# Patient Record
Sex: FEMALE | Employment: STUDENT | URBAN - METROPOLITAN AREA
[De-identification: names, ages, dates, MRNs, and addresses within clinical notes are randomized per-mention and may not be internally consistent; named-entity substitution may affect disease eponyms.]

---
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
## 2023-05-08 ENCOUNTER — TELEPHONE (OUTPATIENT)
Dept: OTHER | Facility: OTHER | Age: 18
End: 2023-05-08

## 2023-05-08 NOTE — TELEPHONE ENCOUNTER
The patient schedule an appointment [facility] and she would like to change the appointment for Virtual

## 2023-05-25 ENCOUNTER — TELEMEDICINE (OUTPATIENT)
Dept: FAMILY MEDICINE CLINIC | Facility: HOSPITAL | Age: 18
End: 2023-05-25

## 2023-05-25 VITALS — BODY MASS INDEX: 40.97 KG/M2 | HEIGHT: 64 IN | WEIGHT: 240 LBS

## 2023-05-25 DIAGNOSIS — Z30.9 ENCOUNTER FOR CONTRACEPTIVE MANAGEMENT, UNSPECIFIED TYPE: Primary | ICD-10-CM

## 2023-05-25 DIAGNOSIS — F41.1 GAD (GENERALIZED ANXIETY DISORDER): ICD-10-CM

## 2023-05-25 DIAGNOSIS — F33.2 SEVERE EPISODE OF RECURRENT MAJOR DEPRESSIVE DISORDER, WITHOUT PSYCHOTIC FEATURES (HCC): ICD-10-CM

## 2023-05-25 RX ORDER — SERTRALINE HYDROCHLORIDE 100 MG/1
50 TABLET, FILM COATED ORAL DAILY
Qty: 90 TABLET | Refills: 3 | Status: SHIPPED | OUTPATIENT
Start: 2023-05-25

## 2023-05-25 RX ORDER — HYDROXYZINE HYDROCHLORIDE 10 MG/1
10 TABLET, FILM COATED ORAL 4 TIMES DAILY PRN
COMMUNITY
Start: 2023-05-23

## 2023-05-25 NOTE — PROGRESS NOTES
Virtual Regular Visit  Verification of patient location:  Patient is located in car, in the following state in which I hold an active license [place]    Assessment/Plan:   Diagnosis ICD-10-CM Associated Orders   1  Encounter for contraceptive management, unspecified type  Z30 9 Ambulatory Referral to Obstetrics / Gynecology      2  Severe episode of recurrent major depressive disorder, without psychotic features (HCC)  F33 2 sertraline (ZOLOFT) 100 mg tablet      3  KISHORE (generalized anxiety disorder)  F41 1 sertraline (ZOLOFT) 100 mg tablet      Patient's medications were reviewed and reconciled  Ordered/Re-ordered as above  Ref to GYN for planned LARC  C/W psychiatry & meds  F/U in 1-2 months or sooner for annual physical       Reason for visit is   Chief Complaint   Patient presents with   • Follow-up     [phone]   • Virtual Regular Visit        Encounter provider Rosalio Bain DO    Provider located at [address] [place] [id number]      Recent Visits  Date Type Provider Dept   05/25/23 Telemedicine Rosalio Bain DO [facility]   Showing recent visits within past 7 days and meeting all other requirements  Future Appointments  No visits were found meeting these conditions  Showing future appointments within next 150 days and meeting all other requirements     The patient was identified by name and date of birth  Alo Marcelo was informed that this is a telemedicine visit and that the visit is being conducted through the [employer]  She agrees to proceed  My office door was closed  No one else was in the room  She acknowledged consent and understanding of privacy and security of the video platform  The patient has agreed to participate and understands they can discontinue the visit at any time  Patient is aware this is a billable service       Venkatesh Melendez "Eliel Ferrell is a 21 y o  female   HPI   Wellbutrin has stayed the same, Was on lexapro, but not as helpful for the anxiety as the zoloft was  2d ago saw psych in [facility] - Dr Reymundo Chavez, Low dose lexapro stopped, and now resuming zoloft, 100 mg, starting at half  Considering going up on wellbutrin in the future if needed  Had several weeks of daily break downs without zoloft, now better  3 more weeks at school for research project, home then cruise, then home for summer until fall again  Past Medical History:   Diagnosis Date   • Anxiety    • Depression    • Headache        Past Surgical History:   Procedure Laterality Date   • NO PAST SURGERIES         Current Outpatient Medications   Medication Sig Dispense Refill   • Altavera 0 15-30 MG-MCG per tablet      • buPROPion (WELLBUTRIN XL) 300 mg 24 hr tablet Take 1 tablet (300 mg total) by mouth every morning 1 daily 90 tablet 3   • Cholecalciferol (Vitamin D3) 50 MCG (2000 UT) TABS Take 2,000 Units by mouth daily     • hydrOXYzine HCL (ATARAX) 10 mg tablet Take 10 mg by mouth 4 (four) times a day as needed     • hyoscyamine (LEVSIN/SL) 0 125 mg SL tablet Take 1 tablet (0 125 mg total) by mouth every 4 (four) hours as needed for cramping 60 tablet 2   • MELATONIN PO Take by mouth 1 at hs--unknown strength     • sertraline (ZOLOFT) 100 mg tablet Take 0 5 tablets (50 mg total) by mouth daily 90 tablet 3     No current facility-administered medications for this visit  No Known Allergies    Review of Systems   Constitutional: Negative for chills and fever  Neurological: Negative for light-headedness and numbness  Video Exam  Vitals:    05/25/23 1256   Weight: 109 kg (240 lb)   Height: 5' 4\" (1 626 m)     Physical Exam  Constitutional:       General: She is not in acute distress  Appearance: Normal appearance  She is obese  She is not ill-appearing  HENT:      Head: Normocephalic and atraumatic        Right Ear: External ear normal       Left Ear: " External ear normal    Eyes:      General: No scleral icterus  Right eye: No discharge  Left eye: No discharge  Pulmonary:      Effort: Pulmonary effort is normal  No respiratory distress  Musculoskeletal:      Cervical back: Normal range of motion  No rigidity  Neurological:      Mental Status: She is alert and oriented to person, place, and time  Coordination: Coordination normal    Psychiatric:         Mood and Affect: Mood normal          Behavior: Behavior normal          Thought Content:  Thought content normal          Judgment: Judgment normal           Visit Time  Total Visit Duration: 25 mins

## 2023-06-20 ENCOUNTER — OFFICE VISIT (OUTPATIENT)
Dept: OBGYN CLINIC | Facility: CLINIC | Age: 18
End: 2023-06-20
Payer: COMMERCIAL

## 2023-06-20 VITALS
BODY MASS INDEX: 41.25 KG/M2 | WEIGHT: 232.8 LBS | HEIGHT: 63 IN | DIASTOLIC BLOOD PRESSURE: 76 MMHG | SYSTOLIC BLOOD PRESSURE: 120 MMHG

## 2023-06-20 DIAGNOSIS — Z30.9 ENCOUNTER FOR CONTRACEPTIVE MANAGEMENT, UNSPECIFIED TYPE: ICD-10-CM

## 2023-06-20 DIAGNOSIS — Z01.419 ENCOUNTER FOR GYNECOLOGICAL EXAMINATION WITHOUT ABNORMAL FINDING: Primary | ICD-10-CM

## 2023-06-20 DIAGNOSIS — Z11.3 SCREEN FOR STD (SEXUALLY TRANSMITTED DISEASE): ICD-10-CM

## 2023-06-20 PROCEDURE — S0610 ANNUAL GYNECOLOGICAL EXAMINA: HCPCS | Performed by: NURSE PRACTITIONER

## 2023-06-20 NOTE — PROGRESS NOTES
Here for Nexplanon insertion  Universal Protocol:  Consent: Verbal consent obtained  Risks and benefits: risks, benefits and alternatives were discussed  Consent given by: patient  Patient understanding: patient states understanding of the procedure being performed  Patient identity confirmed: verbally with patient    Remove and insert drug implant    Date/Time: 6/22/2023 1:00 PM    Performed by: REMI Orourke  Authorized by: REMI Orourke    Indication:     Indication: Insertion of non-biodegradable drug delivery implant    Pre-procedure:     Prepped with: povidone-iodine      Local anesthetic:  Lidocaine with epinephrine  Procedure:     Procedure: Insertion    Small stab incision was made in arm: yes      Left/right:  Right    Preloaded contraceptive capsule trocar was placed subdermally: yes      Visualization of implant was obtained: yes      Contraceptive capsule was inserted and trocar removed: yes      Visualization of notch in stylet and palpation of device: yes      Palpation confirms placement by provider and patient: yes      Site was closed with steri-strips and pressure bandage applied: yes    Comments:      Irregular bleeding may persist for up to 3 months  Leave outside dressing on x 24 hours   Steri strips in place x 1 week  Monitor for signs of infection redness, warmth, increasing pain, pus, fever/chills  Return in 1 month for recheck

## 2023-06-20 NOTE — PATIENT INSTRUCTIONS
Pap smear to start at age 22 as per ASCCP guidelines  GC/CT cultures annually once sexually active, always condom use when sexually active,    Interested in C/ [place] 9 aware of irreg bleeding profile   Will check ins Return for insertion on Thursday

## 2023-06-20 NOTE — PROGRESS NOTES
Assessment/Plan:  Pap smear to start at age 22 as per ASCCP guidelines  GC/CT cultures annually once sexually active, always condom use when sexually active,    Interested in [address] aware of irreg bleeding profile  Will check ins Return for insertion on Thursday       Diagnoses and all orders for this visit:    Encounter for gynecological examination without abnormal finding  -     Chlamydia/N  gonorrhoeae RNA, TMA    Encounter for contraceptive management, unspecified type  Comments:  interested in Nexplanon R/B discussed will return Thursday for insertion  Orders:  -     Ambulatory Referral to Obstetrics / Gynecology    Screen for STD (sexually transmitted disease)  -     Chlamydia/N  gonorrhoeae RNA, TMA          Subjective:      Patient ID: Serjio Morales is a 19 y o  female  New pt here for annual gyn and bc Has 2 moms with sperm donor Pt in a relationship with transwoman Interested in [address] monthly Day 2-3 Heavy then tapers total of 4  Minimal cramps No other pain Bowel and bladder are normal Ano unusual discharge H/o depression Stable Continues to follow with therapist Augusto for Psych        The following portions of the patient's history were reviewed and updated as appropriate: allergies, current medications, past family history, past medical history, past social history, past surgical history and problem list     Review of Systems   Constitutional: Negative for fatigue and unexpected weight change  Gastrointestinal: Negative for abdominal distention, abdominal pain, constipation and diarrhea  Genitourinary: Negative for difficulty urinating, dyspareunia, dysuria, frequency, genital sores, menstrual problem, pelvic pain, urgency, vaginal bleeding, vaginal discharge and vaginal pain  Neurological: Negative for headaches  Psychiatric/Behavioral: Negative  Negative for dysphoric mood  The patient is not nervous/anxious            Objective:      /76 (BP "Location: Left arm, Patient Position: Sitting, Cuff Size: Large)   Ht 5' 3\" (1 6 m)   Wt 106 kg (232 lb 12 8 oz)   LMP 05/18/2023 (Exact Date)   BMI 41 24 kg/m²          Physical Exam  Vitals and nursing note reviewed  Constitutional:       General: She is not in acute distress  Appearance: Normal appearance  HENT:      Head: Normocephalic and atraumatic  Pulmonary:      Effort: Pulmonary effort is normal    Chest:   Breasts:     Breasts are symmetrical       Right: Normal  No mass, nipple discharge, skin change or tenderness  Left: Normal  No mass, nipple discharge, skin change or tenderness  Abdominal:      General: There is no distension  Palpations: Abdomen is soft  Tenderness: There is no abdominal tenderness  There is no guarding or rebound  Genitourinary:     General: Normal vulva  Exam position: Lithotomy position  Labia:         Right: No rash, tenderness, lesion or injury  Left: No rash, tenderness, lesion or injury  Urethra: No prolapse, urethral pain, urethral swelling or urethral lesion  Vagina: Normal  No erythema or lesions  Cervix: No cervical motion tenderness, discharge, lesion or cervical bleeding  Uterus: Normal        Adnexa: Right adnexa normal and left adnexa normal         Right: No mass or tenderness  Left: No mass or tenderness  Rectum: No mass or external hemorrhoid  Comments: G/C from cervix  Musculoskeletal:         General: Normal range of motion  Lymphadenopathy:      Upper Body:      Right upper body: No axillary adenopathy  Left upper body: No axillary adenopathy  Lower Body: No right inguinal adenopathy  No left inguinal adenopathy  Skin:     General: Skin is warm and dry  Neurological:      Mental Status: She is alert and oriented to person, place, and time     Psychiatric:         Mood and Affect: Mood normal          Behavior: Behavior normal          Thought Content: " Thought content normal          Judgment: Judgment normal

## 2023-06-21 LAB
C TRACH RRNA SPEC QL NAA+PROBE: NOT DETECTED
N GONORRHOEA RRNA SPEC QL NAA+PROBE: NOT DETECTED

## 2023-06-22 ENCOUNTER — PROCEDURE VISIT (OUTPATIENT)
Dept: OBGYN CLINIC | Facility: CLINIC | Age: 18
End: 2023-06-22
Payer: COMMERCIAL

## 2023-06-22 DIAGNOSIS — Z32.00 ENCOUNTER FOR PREGNANCY TEST, RESULT UNKNOWN: ICD-10-CM

## 2023-06-22 DIAGNOSIS — Z30.017 NEXPLANON INSERTION: Primary | ICD-10-CM

## 2023-06-22 LAB — SL AMB POCT URINE HCG: NEGATIVE

## 2023-06-22 PROCEDURE — 81025 URINE PREGNANCY TEST: CPT | Performed by: NURSE PRACTITIONER

## 2023-06-22 PROCEDURE — 11981 INSERTION DRUG DLVR IMPLANT: CPT | Performed by: NURSE PRACTITIONER

## 2023-07-12 ENCOUNTER — OFFICE VISIT (OUTPATIENT)
Dept: FAMILY MEDICINE CLINIC | Facility: HOSPITAL | Age: 18
End: 2023-07-12
Payer: COMMERCIAL

## 2023-07-12 VITALS
SYSTOLIC BLOOD PRESSURE: 110 MMHG | OXYGEN SATURATION: 98 % | HEART RATE: 102 BPM | WEIGHT: 229 LBS | HEIGHT: 63 IN | BODY MASS INDEX: 40.57 KG/M2 | DIASTOLIC BLOOD PRESSURE: 80 MMHG

## 2023-07-12 DIAGNOSIS — Z00.00 ROUTINE ADULT HEALTH MAINTENANCE: Primary | ICD-10-CM

## 2023-07-12 DIAGNOSIS — E55.9 VITAMIN D DEFICIENCY: ICD-10-CM

## 2023-07-12 DIAGNOSIS — F41.1 GAD (GENERALIZED ANXIETY DISORDER): ICD-10-CM

## 2023-07-12 DIAGNOSIS — R45.89 DEPRESSED MOOD: ICD-10-CM

## 2023-07-12 DIAGNOSIS — F33.2 SEVERE EPISODE OF RECURRENT MAJOR DEPRESSIVE DISORDER, WITHOUT PSYCHOTIC FEATURES (HCC): ICD-10-CM

## 2023-07-12 PROCEDURE — 99395 PREV VISIT EST AGE 18-39: CPT | Performed by: STUDENT IN AN ORGANIZED HEALTH CARE EDUCATION/TRAINING PROGRAM

## 2023-07-12 RX ORDER — SERTRALINE HYDROCHLORIDE 100 MG/1
150 TABLET, FILM COATED ORAL DAILY
Qty: 90 TABLET | Refills: 3 | Status: SHIPPED | OUTPATIENT
Start: 2023-07-12

## 2023-07-12 NOTE — PROGRESS NOTES
[address] [facility] PRIMARY CARE [address]    NAME: Alma Fernandez  AGE: 19 y.o. SEX: female  : 2005  DATE: 2023     Assessment and Plan:      Diagnosis ICD-10-CM Associated Orders   1. Routine adult health maintenance  Z00.00       2. Severe episode of recurrent major depressive disorder, without psychotic features (HCC)  F33.2 sertraline (ZOLOFT) 100 mg tablet      3. KISHORE (generalized anxiety disorder)  F41.1 sertraline (ZOLOFT) 100 mg tablet      4. Depressed mood  R45.89       5. Vitamin D deficiency  E55.9       6. BMI 40.0-44.9, adult [facility])  Z68.41         Patient's medications were reviewed and reconciled. Ordered/Re-ordered as above. Continue with GYN and Psychiatry. Immunizations and preventive care screenings were discussed with patient today. Appropriate education was printed on patient's after visit summary. Counseling:  Alcohol/drug use: discussed moderation in alcohol intake, the recommendations for healthy alcohol use, and avoidance of illicit drug use. Dental Health: discussed importance of regular tooth brushing, flossing, and dental visits. Injury prevention: discussed safety/seat belts, safety helmets, smoke detectors, carbon dioxide detectors, and smoking near bedding or upholstery. Sexual health: discussed sexually transmitted diseases, partner selection, use of condoms, avoidance of unintended pregnancy, and contraceptive alternatives. · Exercise: the importance of regular exercise/physical activity was discussed. Recommend exercise 3-5 times per week for at least 30 minutes. BMI Counseling: Body mass index is 40.57 kg/m². The BMI is above normal. Nutrition recommendations include decreasing portion sizes and encouraging healthy choices of fruits and vegetables. Exercise recommendations include moderate physical activity 150 minutes/week, exercising 3-5 times per week and strength training exercises. Rationale for BMI follow-up plan is due to patient being overweight or obese. Depression Screening and Follow-up Plan: Patient's depression screening was positive with a PHQ-2 score of 3. Their PHQ-9 score was 9. Continue regular follow-up with their mental health provider who is managing their mental health condition(s). No follow-ups on file. Chief Complaint:     Chief Complaint   Patient presents with   • Physical Exam      History of Present Illness:     Adult Annual Physical   Patient here for a comprehensive physical exam.     Diet and Physical Activity  · Diet/Nutrition: plant based when home & gets mom cooked meals, school cafe  · Exercise: moderate cardiovascular exercise, strength training exercises, 1-2 times a week on average and core workout. No Drug use. Tobacco use:  reports that she has never smoked. She has never used smokeless tobacco.  · Alcohol use - no    Highest 240 - lost 8 lbs. Was doing more dorm, more walking - hills at school. Wt Readings from Last 3 Encounters:   23 104 kg (229 lb)   23 106 kg (232 lb 12.8 oz)   23 109 kg (240 lb) (>99 %, Z= 2.43)*     * Growth percentiles are based on CDC (Girls, 2-20 Years) data. Depression Screening  PHQ-2/9 Depression Screening    Little interest or pleasure in doing things: 1 - several days  Feeling down, depressed, or hopeless: 2 - more than half the days  Trouble falling or staying asleep, or sleeping too much: 0 - not at all  Feeling tired or having little energy: 1 - several days  Poor appetite or overeatin - not at all  Feeling bad about yourself - or that you are a failure or have let yourself or your family down: 2 - more than half the days  Trouble concentrating on things, such as reading the newspaper or watching television: 2 - more than half the days  Moving or speaking so slowly that other people could have noticed.  Or the opposite - being so fidgety or restless that you have been moving around a lot more than usual: 1 - several days  Thoughts that you would be better off dead, or of hurting yourself in some way: 0 - not at all  PHQ-2 Score: 3  PHQ-2 Interpretation: POSITIVE depression screen  PHQ-9 Score: 9   PHQ-9 Interpretation: Mild depression      Can have very bad mood swings & trying to stay on her meds. Does forget often. Anxious & worsened paranoia without meds. No SI or HI. Stress with mom at times. Last semester was awful. Will be a clair. General Health  · Sleep: sleeps well. · Hearing: normal - bilateral.  · Vision: goes for regular eye exams and wears glasses. · Dental: regular dental visits and brushes teeth twice daily. · Just had wisdom teeth out - all 4    /GYN Health  · Last menstrual period: 6/19/23  · Contraceptive method: nexplanon. · History of STDs?: no  · Has GF - she is trans - M to F     Review of Systems:     Review of Systems   Constitutional: Negative for chills and fever. Respiratory: Negative for cough and shortness of breath. Cardiovascular: Negative for chest pain and palpitations. Genitourinary: Negative for pelvic pain and vaginal bleeding. Neurological: Positive for headaches (minor - tried to drink more water). Negative for light-headedness. Psychiatric/Behavioral: Positive for dysphoric mood. The patient is nervous/anxious.         Past Medical History:     Past Medical History:   Diagnosis Date   • Anxiety    • Depression    • Headache       Past Surgical History:     Past Surgical History:   Procedure Laterality Date   • NO PAST SURGERIES     • WISDOM TOOTH EXTRACTION        Social History:     Social History     Socioeconomic History   • Marital status: Single     Spouse name: None   • Number of children: None   • Years of education: None   • Highest education level: None   Occupational History   • None   Tobacco Use   • Smoking status: Never   • Smokeless tobacco: Never   Vaping Use   • Vaping Use: Never used   Substance and Sexual Activity   • Alcohol use: Never   • Drug use: Never   • Sexual activity: Yes     Birth control/protection: Condom Male, OCP     Comment: transwomen - 1 new partner in past year   Other Topics Concern   • None   Social History Narrative    Lives with mom, step-mom and brother.     Feels safe at home    Sees dentist reg     Social Determinants of Health     Financial Resource Strain: Not on file   Food Insecurity: Not on file   Transportation Needs: Not on file   Physical Activity: Not on file   Stress: Not on file   Social Connections: Not on file   Intimate Partner Violence: Not on file   Housing Stability: Not on file      Family History:     Family History   Problem Relation Age of Onset   • Mental illness Mother    • Depression Mother    • Anxiety disorder Mother    • Hypertension Mother    • No Known Problems Father    • Hypertension Brother    • Heart disease Maternal Grandmother    • Thyroid disease Maternal Grandmother    • Hypertension Maternal Grandmother    • Hyperlipidemia Maternal Grandmother    • Prostate cancer Maternal Grandfather    • Cancer Maternal Grandfather         AITL   • Heart disease Maternal Grandfather    • Hypertension Maternal Grandfather    • Hyperlipidemia Maternal Grandfather    • Asthma Maternal Aunt    • Heart disease Maternal Uncle    • Hypertension Maternal Uncle    • Hyperlipidemia Maternal Uncle    • Colon polyps Neg Hx    • Colon cancer Neg Hx       Current Medications:     Current Outpatient Medications   Medication Sig Dispense Refill   • buPROPion (WELLBUTRIN XL) 300 mg 24 hr tablet Take 1 tablet (300 mg total) by mouth every morning 1 daily 90 tablet 3   • Cholecalciferol (Vitamin D3) 50 MCG (2000 UT) TABS Take 2,000 Units by mouth daily     • etonogestrel (NEXPLANON) subdermal implant 68 mg by Subdermal route once 3 yrs     • hydrOXYzine HCL (ATARAX) 10 mg tablet Take 10 mg by mouth 4 (four) times a day as needed     • sertraline (ZOLOFT) 100 mg tablet Take 1.5 tablets (150 mg total) by mouth daily 90 tablet 3     No current facility-administered medications for this visit. Allergies:     No Known Allergies   Physical Exam:     /80   Pulse 102   Ht 5' 3" (1.6 m)   Wt 104 kg (229 lb)   LMP 05/18/2023 (Exact Date)   SpO2 98%   BMI 40.57 kg/m²     Physical Exam  Vitals reviewed. Constitutional:       General: She is not in acute distress. Appearance: Normal appearance. She is obese. She is not ill-appearing. HENT:      Head: Normocephalic and atraumatic. Right Ear: Tympanic membrane, ear canal and external ear normal. There is no impacted cerumen. Left Ear: Tympanic membrane, ear canal and external ear normal. There is no impacted cerumen. Nose: Nose normal. No congestion or rhinorrhea. Mouth/Throat:      Mouth: Mucous membranes are moist.      Pharynx: Oropharynx is clear. No oropharyngeal exudate or posterior oropharyngeal erythema. Eyes:      General: No scleral icterus. Right eye: No discharge. Left eye: No discharge. Conjunctiva/sclera: Conjunctivae normal.   Cardiovascular:      Rate and Rhythm: Normal rate and regular rhythm. Pulses: Normal pulses. Heart sounds: Normal heart sounds. No murmur heard. No friction rub. No gallop. Pulmonary:      Effort: Pulmonary effort is normal. No respiratory distress. Breath sounds: Normal breath sounds. No stridor. No wheezing. Abdominal:      General: Abdomen is flat. Bowel sounds are normal. There is no distension. Palpations: Abdomen is soft. There is no mass. Tenderness: There is no abdominal tenderness. Musculoskeletal:         General: No swelling or tenderness. Normal range of motion. Cervical back: Normal range of motion and neck supple. No rigidity. Right lower leg: No edema. Left lower leg: No edema. Lymphadenopathy:      Cervical: No cervical adenopathy. Skin:     General: Skin is warm and dry. Capillary Refill: Capillary refill takes less than 2 seconds. Coloration: Skin is not jaundiced or pale. Neurological:      Mental Status: She is alert and oriented to person, place, and time. Psychiatric:         Mood and Affect: Mood normal.         Behavior: Behavior normal.         Thought Content:  Thought content normal.         Judgment: Judgment normal.        Josephine Addison,    [address]

## 2023-07-17 NOTE — PROGRESS NOTES
Assessment/Plan:  Irregular bleeding may persist for up to 3 months   Call with concerns   F/u annual gyn        Diagnoses and all orders for this visit:    Korin Jaquez in place          Subjective:      Patient ID: Gus Mcintosh is a 19 y.o. female. Here for nexplanon check inserted 6/2023 doing well No periods or bleeding Mood is stable h/o depression No headaches   Insertion site well healed       The following portions of the patient's history were reviewed and updated as appropriate: allergies, current medications, past family history, past medical history, past social history, past surgical history and problem list.    Review of Systems   Gastrointestinal: Negative for nausea. Genitourinary: Negative for menstrual problem, pelvic pain and vaginal bleeding. Neurological: Negative for headaches. Psychiatric/Behavioral: Negative for behavioral problems and dysphoric mood. Objective:      /82 (BP Location: Right arm, Patient Position: Sitting, Cuff Size: Large)   Ht 5' 3" (1.6 m)   Wt 106 kg (233 lb 12.8 oz)   LMP 05/18/2023 (Exact Date)   BMI 41.42 kg/m²          Physical Exam  Constitutional:       General: She is not in acute distress. Appearance: Normal appearance. HENT:      Head: Normocephalic and atraumatic. Pulmonary:      Effort: Pulmonary effort is normal.   Skin:     General: Skin is warm and dry. Comments: Nexplanon insertion site well healed No erythema, swelling, tenderness or bruising.  Implant palpated in arm   Neurological:      Comments: No neuropathy noted   Psychiatric:         Mood and Affect: Mood normal.

## 2023-07-18 ENCOUNTER — OFFICE VISIT (OUTPATIENT)
Dept: OBGYN CLINIC | Facility: CLINIC | Age: 18
End: 2023-07-18
Payer: COMMERCIAL

## 2023-07-18 VITALS
SYSTOLIC BLOOD PRESSURE: 118 MMHG | BODY MASS INDEX: 41.43 KG/M2 | WEIGHT: 233.8 LBS | DIASTOLIC BLOOD PRESSURE: 82 MMHG | HEIGHT: 63 IN

## 2023-07-18 DIAGNOSIS — Z97.5 NEXPLANON IN PLACE: Primary | ICD-10-CM

## 2023-07-18 PROCEDURE — 99212 OFFICE O/P EST SF 10 MIN: CPT | Performed by: NURSE PRACTITIONER

## 2023-10-27 DIAGNOSIS — F33.2 SEVERE EPISODE OF RECURRENT MAJOR DEPRESSIVE DISORDER, WITHOUT PSYCHOTIC FEATURES (HCC): ICD-10-CM

## 2023-10-27 DIAGNOSIS — F41.1 GAD (GENERALIZED ANXIETY DISORDER): ICD-10-CM

## 2023-10-27 RX ORDER — BUPROPION HYDROCHLORIDE 300 MG/1
TABLET ORAL
Qty: 90 TABLET | Refills: 2 | Status: SHIPPED | OUTPATIENT
Start: 2023-10-27

## 2024-10-07 ENCOUNTER — TELEMEDICINE (OUTPATIENT)
Dept: FAMILY MEDICINE CLINIC | Facility: HOSPITAL | Age: 19
End: 2024-10-07
Payer: COMMERCIAL

## 2024-10-07 VITALS — WEIGHT: 263 LBS | BODY MASS INDEX: 44.9 KG/M2 | HEIGHT: 64 IN

## 2024-10-07 DIAGNOSIS — Z11.59 NEED FOR HEPATITIS C SCREENING TEST: ICD-10-CM

## 2024-10-07 DIAGNOSIS — E55.9 VITAMIN D DEFICIENCY: ICD-10-CM

## 2024-10-07 DIAGNOSIS — R45.89 DEPRESSED MOOD: ICD-10-CM

## 2024-10-07 DIAGNOSIS — E66.813 CLASS 3 DRUG-INDUCED OBESITY WITHOUT SERIOUS COMORBIDITY WITH BODY MASS INDEX (BMI) OF 45.0 TO 49.9 IN ADULT (HCC): ICD-10-CM

## 2024-10-07 DIAGNOSIS — F33.0 MILD EPISODE OF RECURRENT MAJOR DEPRESSIVE DISORDER (HCC): Primary | ICD-10-CM

## 2024-10-07 DIAGNOSIS — Z13.220 SCREENING FOR HYPERLIPIDEMIA: ICD-10-CM

## 2024-10-07 DIAGNOSIS — Z13.29 SCREENING FOR THYROID DISORDER: ICD-10-CM

## 2024-10-07 DIAGNOSIS — Z11.4 SCREENING FOR HIV (HUMAN IMMUNODEFICIENCY VIRUS): ICD-10-CM

## 2024-10-07 DIAGNOSIS — R63.5 ABNORMAL WEIGHT GAIN: ICD-10-CM

## 2024-10-07 DIAGNOSIS — E66.1 CLASS 3 DRUG-INDUCED OBESITY WITHOUT SERIOUS COMORBIDITY WITH BODY MASS INDEX (BMI) OF 45.0 TO 49.9 IN ADULT (HCC): ICD-10-CM

## 2024-10-07 DIAGNOSIS — Z11.3 SCREENING EXAMINATION FOR STI: ICD-10-CM

## 2024-10-07 DIAGNOSIS — F41.1 GAD (GENERALIZED ANXIETY DISORDER): ICD-10-CM

## 2024-10-07 DIAGNOSIS — Z13.1 SCREENING FOR DIABETES MELLITUS: ICD-10-CM

## 2024-10-07 DIAGNOSIS — Z13.0 SCREENING FOR IRON DEFICIENCY ANEMIA: ICD-10-CM

## 2024-10-07 PROCEDURE — 99214 OFFICE O/P EST MOD 30 MIN: CPT | Performed by: STUDENT IN AN ORGANIZED HEALTH CARE EDUCATION/TRAINING PROGRAM

## 2024-10-07 RX ORDER — BUPROPION HYDROCHLORIDE 450 MG/1
450 TABLET, FILM COATED, EXTENDED RELEASE ORAL DAILY
COMMUNITY
Start: 2024-09-27

## 2024-10-07 NOTE — PROGRESS NOTES
Virtual Regular Visit  Name: Nora Gregory      : 2005      MRN: [id number]  Encounter Provider: Chetna Barraza DO  Encounter Date: 10/7/2024   Encounter department: [facility] PRIMARY CARE [address]    Verification of patient location:  Patient is located at Home in the following state in which I hold an active license [place]    Assessment & Plan  Mild episode of recurrent major depressive disorder (HCC)  Pt following closely with therapist & psychiatrist.   Changed her meds from wellbutrin 300 mg qd to 450 mg qd.   She is gaining weight though which is making her more depressed.      KISHORE (generalized anxiety disorder)  On zoloft with the wellbutrin daily for her KISHORE & MDD.      Depressed mood  Re-check lab values & then look into weight med options.  Orders:    Comprehensive metabolic panel; Future    Vitamin B12; Future    Iron, TIBC and Ferritin Panel; Future    CBC and differential; Future    Thyroid Peroxidase and Thyroglobulin Antibodies; Future    Comprehensive metabolic panel    Vitamin B12    Iron, TIBC and Ferritin Panel    CBC and differential    Thyroid Peroxidase and Thyroglobulin Antibodies    Vitamin D deficiency  Recheck levels, low before. [place]bid[place]   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 We have to stop it before she gets closer to 300 lbs. We discussed increasing her exercise & her water intake. She needs to shoot for more veggies & lean proteins, less carbs overall.   R/O thyroid etiology & then look into weight meds.   Orders:    TSH, 3rd generation; Future    T4, free; Future    Thyroid Peroxidase and Thyroglobulin Antibodies; Future    TSH, 3rd generation    T4, free    Thyroid Peroxidase and Thyroglobulin Antibodies    Need for  hepatitis C screening test  Screening & diagnostic bloodwork ordered, our office will reach out with results once obtained.   Orders:    Hepatitis C Qualitative by PCR ([facility] ONLY); Future    Hepatitis C Qualitative by PCR ([facility] ONLY)    Screening for HIV (human immunodeficiency virus)  Orders:    HIV 1/2 AG/AB W REFLEX [facility] only; Future    HIV 1/2 AG/AB W REFLEX [facility] and [facility] only    Screening for iron deficiency anemia  Orders:    CBC and differential; Future    CBC and differential    Screening for thyroid disorder  Orders:    TSH, 3rd generation; Future    T4, free; Future    Thyroid Peroxidase and Thyroglobulin Antibodies; Future    TSH, 3rd generation    T4, free    Thyroid Peroxidase and Thyroglobulin Antibodies    Screening for hyperlipidemia  Orders:    Lipid Panel with Direct LDL reflex; Future    Lipid Panel with Direct LDL reflex    Screening for diabetes mellitus  Orders:    Comprehensive metabolic panel; Future    Comprehensive metabolic panel    Abnormal weight gain  Orders:    Thyroid Peroxidase and Thyroglobulin Antibodies; Future    Thyroid Peroxidase and Thyroglobulin Antibodies    Screening examination for STI  Orders:    Hepatitis C Qualitative by PCR ([facility] ONLY); Future    HIV 1/2 AG/AB W REFLEX [facility] only; Future    Chlamydia/GC amplified DNA by PCR; Future    Hepatitis C Qualitative by PCR ([facility] ONLY)    HIV 1/2 AG/AB W REFLEX [facility] only    Chlamydia/GC amplified DNA by PCR      Depression Screening and Follow-up Plan: Patient was screened for depression during today's encounter. They screened negative with a PHQ-2 score of 2.    Return in about 4 weeks (around 11/4/2024) for [facility] - VIRTUAL - PT AT [facility].    Encounter provider Chetna Barraza DO    The patient was identified by name and date of birth. Nora Gregory was informed that this is a telemedicine visit and that the visit is being conducted through the Epic Embedded platform. She  "agrees to proceed..  My office door was closed. No one else was in the room.  She acknowledged consent and understanding of privacy and security of the video platform. The patient has agreed to participate and understands they can discontinue the visit at any time. Patient is aware this is a billable service.     History of Present Illness     HPI  Chief Complaint   Patient presents with    Follow-up     Weight check     Medication Management     Back at school for senior yr in psychology in Sept.     Was seeing psychiatrist - new doctor q4-6 week appts.   Was on wellbutrin 300 mg XL, now on 450 mg XL.   On zoloft 150 mg qd - no recent changes.   Rare use of atarax for anxiety flare.   Feels okay right now. No sleep or eating issues.   Keeping up with school work well.     PHQ-2/9 Depression Screening    Little interest or pleasure in doing things: 1 - several days  Feeling down, depressed, or hopeless: 1 - several days  PHQ-2 Score: 2  PHQ-2 Interpretation: Negative depression screen       Trying to eat healthy, going to gym on her campus.     Depression is worse bc her weight is worsening. Feels \"hopeless\" nothing is working.   20 -30 lb weight gain nearly every yr.   No fam hx of thyroid issues.   Diabetes in the family.     Does have nexplanon - put in last summer.   Was gaining weight prior to that, even since middle school.     Wt Readings from Last 3 Encounters:   10/07/24 119 kg (263 lb)   07/18/23 106 kg (233 lb 12.8 oz)   07/12/23 104 kg (229 lb)     Temp Readings from Last 3 Encounters:   No data found for Temp     BP Readings from Last 3 Encounters:   07/18/23 118/82   07/12/23 110/80   06/20/23 120/76     Pulse Readings from Last 3 Encounters:   07/12/23 102   07/20/22 68   06/21/22 78     Review of Systems  Current Outpatient Medications on File Prior to Visit   Medication Sig Dispense Refill    buPROPion (FORFIVO XL) 450 MG 24 hr tablet Take 450 mg by mouth daily      etonogestrel (NEXPLANON) " "subdermal implant 68 mg by Subdermal route once 3 yrs      hydrOXYzine HCL (ATARAX) 10 mg tablet Take 10 mg by mouth 4 (four) times a day as needed      sertraline (ZOLOFT) 100 mg tablet Take 1.5 tablets (150 mg total) by mouth daily 90 tablet 3    [DISCONTINUED] buPROPion (WELLBUTRIN XL) 300 mg 24 hr tablet TAKE 1 TABLET (300 MG TOTAL) BY MOUTH EVERY MORNING (1 DAILY) (Patient taking differently: Take 450 mg by mouth every morning) 90 tablet 2    Cholecalciferol (Vitamin D3) 50 MCG (2000 UT) TABS Take 2,000 Units by mouth daily (Patient not taking: Reported on 7/18/2023)       No current facility-administered medications on file prior to visit.      Social History     Tobacco Use    Smoking status: Never    Smokeless tobacco: Never   Vaping Use    Vaping status: Never Used   Substance and Sexual Activity    Alcohol use: Never    Drug use: Never    Sexual activity: Yes     Birth control/protection: Condom Male, OCP     Comment: transwomen - 1 new partner in past year     Objective     Ht 5' 4\" (1.626 m)   Wt 119 kg (263 lb)   BMI 45.14 kg/m²   Physical Exam  Vitals and nursing note reviewed.   Constitutional:       General: She is not in acute distress.     Appearance: Normal appearance. She is well-developed and well-groomed. She is morbidly obese. She is not ill-appearing.   HENT:      Head: Normocephalic and atraumatic.   Eyes:      General: No scleral icterus.        Right eye: No discharge.         Left eye: No discharge.      Conjunctiva/sclera: Conjunctivae normal.   Pulmonary:      Effort: Pulmonary effort is normal. No respiratory distress.   Musculoskeletal:      Cervical back: Normal range of motion and neck supple. No rigidity.   Skin:     General: Skin is warm and dry.      Capillary Refill: Capillary refill takes less than 2 seconds.   Neurological:      Mental Status: She is alert and oriented to person, place, and time.   Psychiatric:         Mood and Affect: Mood normal.         Behavior: Behavior " normal. Behavior is cooperative.         Thought Content: Thought content normal.         Judgment: Judgment normal.     Visit Time  Total Visit Duration: 35

## 2024-10-07 NOTE — ASSESSMENT & PLAN NOTE
Recheck levels, low before. [place]   Orders:    Vitamin D 25 hydroxy; Future    Vitamin D 25 hydroxy

## 2024-10-07 NOTE — ASSESSMENT & PLAN NOTE
Re-check lab values & then look into weight med options.  Orders:    Comprehensive metabolic panel; Future    Vitamin B12; Future    Iron, TIBC and Ferritin Panel; Future    CBC and differential; Future    Thyroid Peroxidase and Thyroglobulin Antibodies; Future    Comprehensive metabolic panel    Vitamin B12    Iron, TIBC and Ferritin Panel    CBC and differential    Thyroid Peroxidase and Thyroglobulin Antibodies

## 2024-10-11 LAB
25(OH)D3 SERPL-MCNC: 20 NG/ML (ref 30–100)
ALBUMIN SERPL-MCNC: 4 G/DL (ref 3.6–5.1)
ALBUMIN/GLOB SERPL: 1.5 (CALC) (ref 1–2.5)
ALP SERPL-CCNC: 62 U/L (ref 31–125)
ALT SERPL-CCNC: 12 U/L (ref 6–29)
AST SERPL-CCNC: 16 U/L (ref 10–30)
BASOPHILS # BLD AUTO: 20 CELLS/UL (ref 0–200)
BASOPHILS NFR BLD AUTO: 0.4 %
BILIRUB SERPL-MCNC: 0.5 MG/DL (ref 0.2–1.2)
BUN SERPL-MCNC: 11 MG/DL (ref 7–25)
BUN/CREAT SERPL: NORMAL (CALC) (ref 6–22)
C TRACH RRNA SPEC QL NAA+PROBE: NOT DETECTED
CALCIUM SERPL-MCNC: 9.4 MG/DL (ref 8.6–10.2)
CHLORIDE SERPL-SCNC: 105 MMOL/L (ref 98–110)
CHOLEST SERPL-MCNC: 151 MG/DL
CHOLEST/HDLC SERPL: 3.1 (CALC)
CO2 SERPL-SCNC: 27 MMOL/L (ref 20–32)
CREAT SERPL-MCNC: 0.74 MG/DL (ref 0.5–0.96)
EOSINOPHIL # BLD AUTO: 30 CELLS/UL (ref 15–500)
EOSINOPHIL NFR BLD AUTO: 0.6 %
ERYTHROCYTE [DISTWIDTH] IN BLOOD BY AUTOMATED COUNT: 12.2 % (ref 11–15)
FERRITIN SERPL-MCNC: 12 NG/ML (ref 16–154)
GFR/BSA.PRED SERPLBLD CYS-BASED-ARV: 118 ML/MIN/1.73M2
GLOBULIN SER CALC-MCNC: 2.6 G/DL (CALC) (ref 1.9–3.7)
GLUCOSE SERPL-MCNC: 98 MG/DL (ref 65–139)
HCT VFR BLD AUTO: 39.8 % (ref 35–45)
HCV RNA SERPL NAA+PROBE-ACNC: NORMAL IU/ML
HCV RNA SERPL NAA+PROBE-LOG IU: NORMAL LOG IU/ML
HDLC SERPL-MCNC: 49 MG/DL
HGB BLD-MCNC: 13 G/DL (ref 11.7–15.5)
HIV 1+2 AB+HIV1 P24 AG SERPL QL IA: NORMAL
IRON SATN MFR SERPL: 20 % (CALC) (ref 16–45)
IRON SERPL-MCNC: 73 MCG/DL (ref 40–190)
LDLC SERPL CALC-MCNC: 88 MG/DL (CALC)
LYMPHOCYTES # BLD AUTO: 1645 CELLS/UL (ref 850–3900)
LYMPHOCYTES NFR BLD AUTO: 32.9 %
MCH RBC QN AUTO: 28.3 PG (ref 27–33)
MCHC RBC AUTO-ENTMCNC: 32.7 G/DL (ref 32–36)
MCV RBC AUTO: 86.7 FL (ref 80–100)
MONOCYTES # BLD AUTO: 335 CELLS/UL (ref 200–950)
MONOCYTES NFR BLD AUTO: 6.7 %
N GONORRHOEA RRNA SPEC QL NAA+PROBE: NOT DETECTED
NEUTROPHILS # BLD AUTO: 2970 CELLS/UL (ref 1500–7800)
NEUTROPHILS NFR BLD AUTO: 59.4 %
NONHDLC SERPL-MCNC: 102 MG/DL (CALC)
PLATELET # BLD AUTO: 282 THOUSAND/UL (ref 140–400)
PMV BLD REES-ECKER: 9.6 FL (ref 7.5–12.5)
POTASSIUM SERPL-SCNC: 4 MMOL/L (ref 3.5–5.3)
PROT SERPL-MCNC: 6.6 G/DL (ref 6.1–8.1)
RBC # BLD AUTO: 4.59 MILLION/UL (ref 3.8–5.1)
SODIUM SERPL-SCNC: 139 MMOL/L (ref 135–146)
T4 FREE SERPL-MCNC: 1.2 NG/DL (ref 0.8–1.8)
THYROGLOB AB SERPL-ACNC: <1 IU/ML
THYROPEROXIDASE AB SERPL-ACNC: 1 IU/ML
TIBC SERPL-MCNC: 357 MCG/DL (CALC) (ref 250–450)
TRIGL SERPL-MCNC: 59 MG/DL
TSH SERPL-ACNC: 1.75 MIU/L
VIT B12 SERPL-MCNC: 293 PG/ML (ref 200–1100)
WBC # BLD AUTO: 5 THOUSAND/UL (ref 3.8–10.8)

## 2024-10-14 LAB
25(OH)D3 SERPL-MCNC: 20 NG/ML (ref 30–100)
ALBUMIN SERPL-MCNC: 4 G/DL (ref 3.6–5.1)
ALBUMIN/GLOB SERPL: 1.5 (CALC) (ref 1–2.5)
ALP SERPL-CCNC: 62 U/L (ref 31–125)
ALT SERPL-CCNC: 12 U/L (ref 6–29)
AST SERPL-CCNC: 16 U/L (ref 10–30)
BASOPHILS # BLD AUTO: 20 CELLS/UL (ref 0–200)
BASOPHILS NFR BLD AUTO: 0.4 %
BILIRUB SERPL-MCNC: 0.5 MG/DL (ref 0.2–1.2)
BUN SERPL-MCNC: 11 MG/DL (ref 7–25)
BUN/CREAT SERPL: NORMAL (CALC) (ref 6–22)
C TRACH RRNA SPEC QL NAA+PROBE: NOT DETECTED
CALCIUM SERPL-MCNC: 9.4 MG/DL (ref 8.6–10.2)
CHLORIDE SERPL-SCNC: 105 MMOL/L (ref 98–110)
CHOLEST SERPL-MCNC: 151 MG/DL
CHOLEST/HDLC SERPL: 3.1 (CALC)
CO2 SERPL-SCNC: 27 MMOL/L (ref 20–32)
CREAT SERPL-MCNC: 0.74 MG/DL (ref 0.5–0.96)
EOSINOPHIL # BLD AUTO: 30 CELLS/UL (ref 15–500)
EOSINOPHIL NFR BLD AUTO: 0.6 %
ERYTHROCYTE [DISTWIDTH] IN BLOOD BY AUTOMATED COUNT: 12.2 % (ref 11–15)
FERRITIN SERPL-MCNC: 12 NG/ML (ref 16–154)
GFR/BSA.PRED SERPLBLD CYS-BASED-ARV: 118 ML/MIN/1.73M2
GLOBULIN SER CALC-MCNC: 2.6 G/DL (CALC) (ref 1.9–3.7)
GLUCOSE SERPL-MCNC: 98 MG/DL (ref 65–139)
HBA1C MFR BLD: 5.2 % OF TOTAL HGB
HCT VFR BLD AUTO: 39.8 % (ref 35–45)
HCV RNA SERPL NAA+PROBE-ACNC: NORMAL IU/ML
HCV RNA SERPL NAA+PROBE-LOG IU: NORMAL LOG IU/ML
HDLC SERPL-MCNC: 49 MG/DL
HGB BLD-MCNC: 13 G/DL (ref 11.7–15.5)
HIV 1+2 AB+HIV1 P24 AG SERPL QL IA: NORMAL
IRON SATN MFR SERPL: 20 % (CALC) (ref 16–45)
IRON SERPL-MCNC: 73 MCG/DL (ref 40–190)
LDLC SERPL CALC-MCNC: 88 MG/DL (CALC)
LYMPHOCYTES # BLD AUTO: 1645 CELLS/UL (ref 850–3900)
LYMPHOCYTES NFR BLD AUTO: 32.9 %
MCH RBC QN AUTO: 28.3 PG (ref 27–33)
MCHC RBC AUTO-ENTMCNC: 32.7 G/DL (ref 32–36)
MCV RBC AUTO: 86.7 FL (ref 80–100)
MONOCYTES # BLD AUTO: 335 CELLS/UL (ref 200–950)
MONOCYTES NFR BLD AUTO: 6.7 %
N GONORRHOEA RRNA SPEC QL NAA+PROBE: NOT DETECTED
NEUTROPHILS # BLD AUTO: 2970 CELLS/UL (ref 1500–7800)
NEUTROPHILS NFR BLD AUTO: 59.4 %
NONHDLC SERPL-MCNC: 102 MG/DL (CALC)
PLATELET # BLD AUTO: 282 THOUSAND/UL (ref 140–400)
PMV BLD REES-ECKER: 9.6 FL (ref 7.5–12.5)
POTASSIUM SERPL-SCNC: 4 MMOL/L (ref 3.5–5.3)
PROT SERPL-MCNC: 6.6 G/DL (ref 6.1–8.1)
RBC # BLD AUTO: 4.59 MILLION/UL (ref 3.8–5.1)
REF LAB TEST NAME: NORMAL
REF LAB TEST: NORMAL
SL AMB CLIENT CONTACT: NORMAL
SODIUM SERPL-SCNC: 139 MMOL/L (ref 135–146)
T4 FREE SERPL-MCNC: 1.2 NG/DL (ref 0.8–1.8)
THYROGLOB AB SERPL-ACNC: <1 IU/ML
THYROPEROXIDASE AB SERPL-ACNC: 1 IU/ML
TIBC SERPL-MCNC: 357 MCG/DL (CALC) (ref 250–450)
TRIGL SERPL-MCNC: 59 MG/DL
TSH SERPL-ACNC: 1.75 MIU/L
VIT B12 SERPL-MCNC: 293 PG/ML (ref 200–1100)
WBC # BLD AUTO: 5 THOUSAND/UL (ref 3.8–10.8)

## 2024-10-30 ENCOUNTER — TELEMEDICINE (OUTPATIENT)
Dept: FAMILY MEDICINE CLINIC | Facility: HOSPITAL | Age: 19
End: 2024-10-30
Payer: COMMERCIAL

## 2024-10-30 VITALS — HEIGHT: 64 IN | WEIGHT: 266 LBS | BODY MASS INDEX: 45.41 KG/M2

## 2024-10-30 DIAGNOSIS — F41.1 GAD (GENERALIZED ANXIETY DISORDER): Chronic | ICD-10-CM

## 2024-10-30 DIAGNOSIS — E66.813 CLASS 3 SEVERE OBESITY DUE TO EXCESS CALORIES WITHOUT SERIOUS COMORBIDITY WITH BODY MASS INDEX (BMI) OF 40.0 TO 44.9 IN ADULT (HCC): Primary | ICD-10-CM

## 2024-10-30 DIAGNOSIS — E66.01 CLASS 3 SEVERE OBESITY DUE TO EXCESS CALORIES WITHOUT SERIOUS COMORBIDITY WITH BODY MASS INDEX (BMI) OF 40.0 TO 44.9 IN ADULT (HCC): Primary | ICD-10-CM

## 2024-10-30 DIAGNOSIS — R79.0 LOW FERRITIN: ICD-10-CM

## 2024-10-30 DIAGNOSIS — E55.9 VITAMIN D DEFICIENCY: ICD-10-CM

## 2024-10-30 DIAGNOSIS — F33.0 MILD EPISODE OF RECURRENT MAJOR DEPRESSIVE DISORDER (HCC): ICD-10-CM

## 2024-10-30 PROCEDURE — 99214 OFFICE O/P EST MOD 30 MIN: CPT | Performed by: STUDENT IN AN ORGANIZED HEALTH CARE EDUCATION/TRAINING PROGRAM

## 2024-10-30 RX ORDER — TIRZEPATIDE 2.5 MG/.5ML
2.5 INJECTION, SOLUTION SUBCUTANEOUS WEEKLY
Qty: 2 ML | Refills: 0 | Status: SHIPPED | OUTPATIENT
Start: 2024-10-30 | End: 2024-11-27

## 2024-10-30 NOTE — PROGRESS NOTES
Virtual Regular Visit  Name: Nora Gregory      : 2005      MRN: [id number]  Encounter Provider: Chetna Barraza DO  Encounter Date: 10/30/2024   Encounter department: [facility] [address]    Verification of patient location: Pt is located at Home in the following state in which I hold an active license [place]    Assessment & Plan  Class 3 severe obesity due to excess calories without serious comorbidity with body mass index (BMI) of 40.0 to 44.9 in adult (HCC)  Prior Authorization Clinical Questions for Weight Management Pharmacotherapy    1. Does the patient have a contrainidcation to medication prescribed for weight management?: No  2. Does the patient have a diagnosis of obesity, confirmed by a BMI greater than or equal to 30 kg/m^2?: Yes  3. Does the patient have a BMI of greater than or equal to 27 kg/m^2 with at least one weight-related comorbidity/risk factor/complication (e.g. diabetes, dyslipidemia, coronary artery disease)?: No  4. Weight-related co-morbidities/risk factors: dyslipidemia, depression  5. Has the patient been on a weight loss regimen of low-calorie diet, increased physical activity, and lifestyle modifications for a minimum of 6 months?: Yes  6. Has the patient completed a comprehensive weight loss program (ie, Weight Watchers, Noom, Bariatrics, other clay on phone)? If so, what?: No  7. Does the patient have a history of type 2 diabetes?: No  8. Has the member tried and failed other weight loss medication within the past 12 months?: No  9. Will the member use requested medication in combination with another GLP agonist or weight loss drug?: No  10. Is the medication a controlled substance?: No  Additional comments: Going to the gym regularly, weighing herself only weekly. In past 3 months only able to gain weight, not lose.   > 6 months now of trying. Increasing her activity, but no changes. Never loses weight, only slows the weight gain.   Obesity in family.       Baseline weight (in pounds): 266 lbs  Current weight (in pounds): 266 lbs  Weight loss percentage: 0%       Orders:    tirzepatide (Zepbound) 2.5 mg/0.5 mL auto-injector; Inject 0.5 mL (2.5 mg total) under the skin once a week for 28 days  Pt would benefit greatly from this medication. A1c increasing, family history of DM2.   Weight very excessive at this time. Chronic illness depression & anxiety hurting her efforts to lose weight.   Mild episode of recurrent major depressive disorder (HCC)  On daily zoloft 150 mg. This could be hurting her weight loss efforts, but has been critical to her mental health.       KISHORE (generalized anxiety disorder)  On above zoloft for both MDD and KISHORE.        Vitamin D deficiency  Supplement sent in, gold daily dose while on this.   Orders:    ergocalciferol (VITAMIN D2) 50,000 units; Take 1 capsule (50,000 Units total) by mouth once a week    Low ferritin  Can take otc iron - ferrous sulfate 324 mg every other day. May cause constipation and green appearing stools. Handout on food sources also given          Encounter provider Chetna Barraza DO    The patient was identified by name and date of birth. Nora Gregory was informed that this is a telemedicine visit and that the visit is being conducted through the Epic Embedded platform. She agrees to proceed..  My office door was closed. No one else was in the room.  She acknowledged consent and understanding of privacy and security of the video platform. The patient has agreed to participate and understands they can discontinue the visit at any time.    Patient is aware this is a billable service.     History of Present Illness     HPI  Chief Complaint   Patient presents with    Follow-up     Lab work     Virtual Regular Visit     Completed labs fasting 10/10/24.   Thyroid tests all wnl.   Vit D low, B12 lower end of range, & Ferritin low.     A1c increased with weight gain, but technically not prediabetic.   FBS 98     Lipids,  "low HDL     STI testing all negative.     Wt Readings from Last 3 Encounters:   10/30/24 121 kg (266 lb)   10/07/24 119 kg (263 lb)   07/18/23 106 kg (233 lb 12.8 oz)     Temp Readings from Last 3 Encounters:   No data found for Temp     BP Readings from Last 3 Encounters:   07/18/23 118/82   07/12/23 110/80   06/20/23 120/76     Pulse Readings from Last 3 Encounters:   07/12/23 102   07/20/22 68   06/21/22 78     History obtained from : patient  Review of Systems   Constitutional:  Negative for chills and fever.   Respiratory:  Negative for cough and shortness of breath.    Cardiovascular:  Negative for chest pain.   Psychiatric/Behavioral:  Positive for dysphoric mood. Negative for self-injury, sleep disturbance and suicidal ideas. The patient is nervous/anxious.        Current Outpatient Medications on File Prior to Visit   Medication Sig Dispense Refill    buPROPion (FORFIVO XL) 450 MG 24 hr tablet Take 450 mg by mouth daily      etonogestrel (NEXPLANON) subdermal implant 68 mg by Subdermal route once 3 yrs      hydrOXYzine HCL (ATARAX) 10 mg tablet Take 10 mg by mouth 4 (four) times a day as needed      sertraline (ZOLOFT) 100 mg tablet Take 1.5 tablets (150 mg total) by mouth daily 90 tablet 3    Cholecalciferol (Vitamin D3) 50 MCG (2000 UT) TABS Take 2,000 Units by mouth daily (Patient not taking: Reported on 7/18/2023)       No current facility-administered medications on file prior to visit.      Social History     Tobacco Use    Smoking status: Never    Smokeless tobacco: Never   Vaping Use    Vaping status: Never Used   Substance and Sexual Activity    Alcohol use: Never    Drug use: Never    Sexual activity: Yes     Birth control/protection: Condom Male, OCP     Comment: transwomen - 1 new partner in past year     Objective     Ht 5' 4\" (1.626 m)   Wt 121 kg (266 lb)   BMI 45.66 kg/m²   Physical Exam  Constitutional:       Appearance: Normal appearance. She is obese.   HENT:      Head: Normocephalic " and atraumatic.   Pulmonary:      Effort: Pulmonary effort is normal. No respiratory distress.   Neurological:      Mental Status: She is alert and oriented to person, place, and time.   Psychiatric:         Mood and Affect: Mood normal.         Behavior: Behavior normal.         Thought Content: Thought content normal.         Judgment: Judgment normal.       Visit Time  Total Visit Duration: 35

## 2024-10-30 NOTE — ASSESSMENT & PLAN NOTE
Supplement sent in, gold daily dose while on this.   Orders:    ergocalciferol (VITAMIN D2) 50,000 units; Take 1 capsule (50,000 Units total) by mouth once a week

## 2024-10-30 NOTE — PATIENT INSTRUCTIONS
Deny it, & possibly look into appeal.     Compounding pharmacies like [facility].

## 2024-10-31 ENCOUNTER — TELEPHONE (OUTPATIENT)
Age: 19
End: 2024-10-31

## 2024-10-31 RX ORDER — ERGOCALCIFEROL 1.25 MG/1
50000 CAPSULE, LIQUID FILLED ORAL WEEKLY
Qty: 12 CAPSULE | Refills: 0 | Status: SHIPPED | OUTPATIENT
Start: 2024-10-31

## 2024-10-31 NOTE — TELEPHONE ENCOUNTER
Reason for call:   [x] Prior Auth  [] Other:     Caller:  [x] Pharmacy  [id number] - [place], [place] - [address]        Ordering Provider:   [x] PCP/Provider - Melinda PATEL 101 / [facility]

## 2024-10-31 NOTE — TELEPHONE ENCOUNTER
PA for (Zepbound) 2.5 mg/0.5 mL EXCLUDED from plan       Reason:(Screenshot if applicable)        Message sent to office clinical pool Yes

## 2024-11-21 ENCOUNTER — TELEPHONE (OUTPATIENT)
Dept: FAMILY MEDICINE CLINIC | Facility: HOSPITAL | Age: 19
End: 2024-11-21

## 2024-11-21 NOTE — TELEPHONE ENCOUNTER
Nora Small    Patient calling requesting update on [employer]. message. Pt states Rx: Zepbound was excluded from plan and requesting alternative medication to be prescribed.    Patient never heard back from office, if possible please call patient back today.     CB: [phone]

## 2024-12-03 DIAGNOSIS — E66.01 CLASS 3 SEVERE OBESITY DUE TO EXCESS CALORIES WITHOUT SERIOUS COMORBIDITY WITH BODY MASS INDEX (BMI) OF 40.0 TO 44.9 IN ADULT (HCC): Primary | ICD-10-CM

## 2024-12-03 DIAGNOSIS — E66.813 CLASS 3 SEVERE OBESITY DUE TO EXCESS CALORIES WITHOUT SERIOUS COMORBIDITY WITH BODY MASS INDEX (BMI) OF 40.0 TO 44.9 IN ADULT (HCC): Primary | ICD-10-CM

## 2024-12-03 RX ORDER — PHENTERMINE HYDROCHLORIDE 15 MG/1
15 CAPSULE ORAL EVERY MORNING
Qty: 30 CAPSULE | Refills: 0 | Status: SHIPPED | OUTPATIENT
Start: 2024-12-03

## 2024-12-30 DIAGNOSIS — E66.01 CLASS 3 SEVERE OBESITY DUE TO EXCESS CALORIES WITHOUT SERIOUS COMORBIDITY WITH BODY MASS INDEX (BMI) OF 40.0 TO 44.9 IN ADULT (HCC): ICD-10-CM

## 2024-12-30 DIAGNOSIS — E66.813 CLASS 3 SEVERE OBESITY DUE TO EXCESS CALORIES WITHOUT SERIOUS COMORBIDITY WITH BODY MASS INDEX (BMI) OF 40.0 TO 44.9 IN ADULT (HCC): ICD-10-CM

## 2024-12-31 DIAGNOSIS — E66.813 CLASS 3 SEVERE OBESITY DUE TO EXCESS CALORIES WITHOUT SERIOUS COMORBIDITY WITH BODY MASS INDEX (BMI) OF 40.0 TO 44.9 IN ADULT (HCC): ICD-10-CM

## 2024-12-31 DIAGNOSIS — E66.01 CLASS 3 SEVERE OBESITY DUE TO EXCESS CALORIES WITHOUT SERIOUS COMORBIDITY WITH BODY MASS INDEX (BMI) OF 40.0 TO 44.9 IN ADULT (HCC): ICD-10-CM

## 2024-12-31 RX ORDER — PHENTERMINE HYDROCHLORIDE 15 MG/1
CAPSULE ORAL
Qty: 30 CAPSULE | Refills: 0 | Status: SHIPPED | OUTPATIENT
Start: 2024-12-31 | End: 2024-12-31 | Stop reason: SDUPTHER

## 2024-12-31 NOTE — TELEPHONE ENCOUNTER
NOT A DUPLICATE - PHARMACY CHANGE.  PATIENT NEEDS 30 SUPPLY TO HOME PHARMACY WHILE SHE IS ON BREAK FROM SCHOOL    Reason for call:   [x] Refill   [] Prior Auth  [] Other:     Office:   [x] PCP/Provider - Chetna Barraza   [] Specialty/Provider -     Medication: phentermine 15 MG     Dose/Frequency: TAKE 1 CAPSULE BY MOUTH EVERY DAY IN THE MORNING     Quantity: 30    Pharmacy: [facility]    Does the patient have enough for 3 days?   [x] Yes   [] No - Send as HP to POD

## 2025-01-02 RX ORDER — PHENTERMINE HYDROCHLORIDE 15 MG/1
15 CAPSULE ORAL EVERY MORNING
Qty: 30 CAPSULE | Refills: 0 | Status: SHIPPED | OUTPATIENT
Start: 2025-01-02

## 2025-02-11 DIAGNOSIS — E66.01 CLASS 3 SEVERE OBESITY DUE TO EXCESS CALORIES WITHOUT SERIOUS COMORBIDITY WITH BODY MASS INDEX (BMI) OF 40.0 TO 44.9 IN ADULT (HCC): ICD-10-CM

## 2025-02-11 DIAGNOSIS — E66.813 CLASS 3 SEVERE OBESITY DUE TO EXCESS CALORIES WITHOUT SERIOUS COMORBIDITY WITH BODY MASS INDEX (BMI) OF 40.0 TO 44.9 IN ADULT (HCC): ICD-10-CM

## 2025-02-11 NOTE — TELEPHONE ENCOUNTER
Reason for call:   [x] Refill   [] Prior Auth  [] Other:     Office:   [x] PCP/Provider -  Chetna Barraza DO   [] Specialty/Provider -     Medication: phentermine 15 MG     Dose/Frequency: 1 cap daily    Quantity: 30 caps    Pharmacy: [id number]  [place] - [address]      Does the patient have enough for 3 days?   [x] Yes   [] No - Send as HP to POD

## 2025-02-12 RX ORDER — PHENTERMINE HYDROCHLORIDE 15 MG/1
15 CAPSULE ORAL EVERY MORNING
Qty: 30 CAPSULE | Refills: 0 | Status: SHIPPED | OUTPATIENT
Start: 2025-02-12

## 2025-03-31 DIAGNOSIS — E66.01 CLASS 3 SEVERE OBESITY DUE TO EXCESS CALORIES WITHOUT SERIOUS COMORBIDITY WITH BODY MASS INDEX (BMI) OF 40.0 TO 44.9 IN ADULT (HCC): ICD-10-CM

## 2025-03-31 DIAGNOSIS — E66.813 CLASS 3 SEVERE OBESITY DUE TO EXCESS CALORIES WITHOUT SERIOUS COMORBIDITY WITH BODY MASS INDEX (BMI) OF 40.0 TO 44.9 IN ADULT (HCC): ICD-10-CM

## 2025-04-01 RX ORDER — PHENTERMINE HYDROCHLORIDE 15 MG/1
CAPSULE ORAL
Qty: 30 CAPSULE | Refills: 0 | Status: SHIPPED | OUTPATIENT
Start: 2025-04-01

## 2025-06-27 DIAGNOSIS — F33.2 SEVERE EPISODE OF RECURRENT MAJOR DEPRESSIVE DISORDER, WITHOUT PSYCHOTIC FEATURES (HCC): ICD-10-CM

## 2025-06-27 DIAGNOSIS — F41.1 GAD (GENERALIZED ANXIETY DISORDER): ICD-10-CM

## 2025-06-27 DIAGNOSIS — R45.89 OTHER SYMPTOMS AND SIGNS INVOLVING EMOTIONAL STATE: ICD-10-CM

## 2025-06-30 DIAGNOSIS — E66.813 CLASS 3 SEVERE OBESITY DUE TO EXCESS CALORIES WITHOUT SERIOUS COMORBIDITY WITH BODY MASS INDEX (BMI) OF 40.0 TO 44.9 IN ADULT: ICD-10-CM

## 2025-06-30 RX ORDER — PHENTERMINE HYDROCHLORIDE 15 MG/1
15 CAPSULE ORAL EVERY MORNING
Qty: 30 CAPSULE | Refills: 0 | Status: SHIPPED | OUTPATIENT
Start: 2025-06-30

## 2025-06-30 RX ORDER — BUPROPION HYDROCHLORIDE 450 MG/1
TABLET, FILM COATED, EXTENDED RELEASE ORAL
Qty: 90 TABLET | Refills: 0 | Status: SHIPPED | OUTPATIENT
Start: 2025-06-30

## 2025-06-30 RX ORDER — SERTRALINE HYDROCHLORIDE 100 MG/1
150 TABLET, FILM COATED ORAL DAILY
Qty: 135 TABLET | Refills: 1 | Status: SHIPPED | OUTPATIENT
Start: 2025-06-30